# Patient Record
Sex: MALE | Race: WHITE
[De-identification: names, ages, dates, MRNs, and addresses within clinical notes are randomized per-mention and may not be internally consistent; named-entity substitution may affect disease eponyms.]

---

## 2023-01-03 ENCOUNTER — HOSPITAL ENCOUNTER (OUTPATIENT)
Dept: HOSPITAL 47 - LABPAT | Age: 53
Discharge: HOME | End: 2023-01-03
Attending: UROLOGY
Payer: COMMERCIAL

## 2023-01-03 DIAGNOSIS — N20.0: ICD-10-CM

## 2023-01-03 DIAGNOSIS — Z01.812: Primary | ICD-10-CM

## 2023-01-03 LAB
ANION GAP SERPL CALC-SCNC: 10 MMOL/L (ref 10–18)
BASOPHILS # BLD AUTO: 0.03 X 10*3/UL (ref 0–0.1)
BASOPHILS NFR BLD AUTO: 0.4 %
BUN SERPL-SCNC: 14.2 MG/DL (ref 9–27)
BUN/CREAT SERPL: 15.78 RATIO (ref 12–20)
CALCIUM SPEC-MCNC: 9.1 MG/DL (ref 8.7–10.3)
CHLORIDE SERPL-SCNC: 105 MMOL/L (ref 96–109)
CO2 SERPL-SCNC: 25 MMOL/L (ref 20–27.5)
EOSINOPHIL # BLD AUTO: 0.22 X 10*3/UL (ref 0.04–0.35)
EOSINOPHIL NFR BLD AUTO: 2.9 %
ERYTHROCYTE [DISTWIDTH] IN BLOOD BY AUTOMATED COUNT: 5.24 X 10*6/UL (ref 4.4–5.6)
ERYTHROCYTE [DISTWIDTH] IN BLOOD: 12.6 % (ref 11.5–14.5)
GLUCOSE SERPL-MCNC: 118 MG/DL (ref 70–110)
HCT VFR BLD AUTO: 43 % (ref 39.6–50)
HGB BLD-MCNC: 15.1 G/DL (ref 13–17)
IMM GRANULOCYTES BLD QL AUTO: 1.1 %
LYMPHOCYTES # SPEC AUTO: 2.14 X 10*3/UL (ref 0.9–5)
LYMPHOCYTES NFR SPEC AUTO: 28.2 %
MCH RBC QN AUTO: 28.8 PG (ref 27–32)
MCHC RBC AUTO-ENTMCNC: 35.1 G/DL (ref 32–37)
MCV RBC AUTO: 82.1 FL (ref 80–97)
MONOCYTES # BLD AUTO: 0.63 X 10*3/UL (ref 0.2–1)
MONOCYTES NFR BLD AUTO: 8.3 %
NEUTROPHILS # BLD AUTO: 4.5 X 10*3/UL (ref 1.8–7.7)
NEUTROPHILS NFR BLD AUTO: 59.1 %
NRBC BLD AUTO-RTO: 0 /100 WBCS (ref 0–0)
PH UR: 5.5 [PH] (ref 5–8)
PLATELET # BLD AUTO: 282 X 10*3/UL (ref 140–440)
POTASSIUM SERPL-SCNC: 3.9 MMOL/L (ref 3.5–5.5)
SODIUM SERPL-SCNC: 140 MMOL/L (ref 135–145)
SP GR UR: 1.02 (ref 1–1.03)
UROBILINOGEN UR QL: 0.2
WBC # BLD AUTO: 7.6 X 10*3/UL (ref 4.5–10)

## 2023-01-03 PROCEDURE — 80048 BASIC METABOLIC PNL TOTAL CA: CPT

## 2023-01-03 PROCEDURE — 81003 URINALYSIS AUTO W/O SCOPE: CPT

## 2023-01-03 PROCEDURE — 87086 URINE CULTURE/COLONY COUNT: CPT

## 2023-01-03 PROCEDURE — 85025 COMPLETE CBC W/AUTO DIFF WBC: CPT

## 2023-01-10 ENCOUNTER — HOSPITAL ENCOUNTER (OUTPATIENT)
Dept: HOSPITAL 47 - OR | Age: 53
Discharge: HOME | End: 2023-01-10
Attending: UROLOGY
Payer: COMMERCIAL

## 2023-01-10 VITALS — SYSTOLIC BLOOD PRESSURE: 142 MMHG | DIASTOLIC BLOOD PRESSURE: 86 MMHG | HEART RATE: 62 BPM | RESPIRATION RATE: 14 BRPM

## 2023-01-10 VITALS — BODY MASS INDEX: 38.3 KG/M2

## 2023-01-10 VITALS — TEMPERATURE: 97.6 F

## 2023-01-10 DIAGNOSIS — M06.9: ICD-10-CM

## 2023-01-10 DIAGNOSIS — I10: ICD-10-CM

## 2023-01-10 DIAGNOSIS — Z79.1: ICD-10-CM

## 2023-01-10 DIAGNOSIS — N20.0: Primary | ICD-10-CM

## 2023-01-10 DIAGNOSIS — Z98.890: ICD-10-CM

## 2023-01-10 DIAGNOSIS — K21.9: ICD-10-CM

## 2023-01-10 DIAGNOSIS — Z87.891: ICD-10-CM

## 2023-01-10 DIAGNOSIS — Z79.899: ICD-10-CM

## 2023-01-10 PROCEDURE — 82365 CALCULUS SPECTROSCOPY: CPT

## 2023-01-10 PROCEDURE — 74018 RADEX ABDOMEN 1 VIEW: CPT

## 2023-01-10 PROCEDURE — 52356 CYSTO/URETERO W/LITHOTRIPSY: CPT

## 2023-01-10 NOTE — FL
EXAMINATION TYPE: FL guidance operating room

 

DATE OF EXAM: 1/10/2023

 

HISTORY: Fluoroscopy  time

 

50 seconds of fluoroscopy provided. 

 

IMPRESSION:

1. Fluoroscopy time.

## 2023-01-10 NOTE — P.OP
Date of Procedure: 01/10/23


Preoperative Diagnosis: 


Bilateral renal stones


Postoperative Diagnosis: 


Same


Procedure(s) Performed: 


Cystoscopy, bilateral ureteroscopy, holmium laser lithotripsy, stone basketing 

and stent insertion


Implants: 


6-Trinidadian by 26 cm stent bilaterally left on a string


Anesthesia: PETAR


Surgeon: Mike Castrejon


Estimated Blood Loss (ml): 10


Pathology: other (bilateral renal stone)


Condition: stable


Disposition: PACU


Indications for Procedure: 


This is a 52-year-old male with history of bilateral renal stones, he is 

symptomatically from his stone.  On the right side he had greater than 7 stones.

 The largest measuring 8mm.  On the left he had multiple stones, all smaller 

than 5 mm.  Option of observation, versus ureteroscopy with holmium laser versus

ESWL was discussed with him in detail.  Risk and benefit of each approach were 

discussed.  He agreed to proceed with a bilateral ureteroscopy with holmium 

laser.  Discussed with him risk which includes but not limited to bleeding, 

infection, injury to the ureter


Operative Findings: 


Multiple Bilateral renal stones,


Description of Procedure: 


Patient brought to the operating room, general anesthesia was induced.  He was 

prepped and draped in sterile fashion and placed in a dorsal lithotomy position.

 Cystoscopy fitted with a 21-Trinidadian sheath was inserted per urethra, cystoscopy 

was performed which showed no abnormality within the bladder.  Attention was 

then carried to the right ureteral orifice which was intubated with a sensor 

wire.  Next the scope was removed with the wire in place next a 1113 Trin[place] 

access sheath was placed over the wire under fluoroscopy into the proximal 

ureter.  Next a flexible ureteroscope was inserted through the access sheath, 

renoscopy was performed showed multiple stones throughout the kidney the largest

of which was in the lower pole.  Using the holmium laser the stones were dusted.

 Sizable fragments were removed using the stone basket.  Repeat renoscopy showed

no additional stones within the kidney or injury to the kidney.  Pullback 

ureteroscopy was performed which showed no injury to the ureter or any ureteral 

fragments.  As the ureteroscope was withdrawn  a sensor wire was advanced 

through.  Next a ureteral stent was passed over the wire, the proximal curl was 

visualized on fluoroscopy and the distal curl was visualized using the scope.  

The stent was left on a string.  At this time  attention was then carried to the

left ureteral orifice which was intubated with a sensor wire.  Next under 

fluoroscopy 1113 Trin[place] access sheath was passed over the wire, into the 

proximal ureter.  A flexible ureteroscope was inserted through the access 

sheath, renoscopy was performed which showed 3 stones within the kidney.  Using 

the holmium laser the stones were dusted, sizable fragments were removed using 

the stone basket.  Repeat renoscopy showed no sizable stones within the kidney 

or injury to the kidney.  Pullback ureteroscopy was performed which showed no 

injury to the ureter or any ureteral stones.  As the ureteroscope was withdrawn 

aa sensor wire was advanced through.  Next a ureteral stent was passed over the 

wire, the proximal curl was visualized on fluoroscopy and the distal curl was 

visualized using the cystoscope.  The bladder was emptied and the end of the 

case.  Both stents were left on a string and taped to the patient penis.  

Patient tolerated the procedure well was taken to recovery in stable condition

## 2023-01-10 NOTE — XR
EXAMINATION TYPE: XR KUB

 

DATE OF EXAM: 1/10/2023

 

COMPARISON: NONE

 

HISTORY: Preop surgery

 

TECHNIQUE: 2 views supine

 

FINDINGS: There are multiple calculi over both kidneys and measure up to 8 mm. Most of the calculi ar
e less than 5 mm. Bowel gas pattern is not acute.

 

IMPRESSION: Numerous bilateral renal calculi. The largest calculus is in the lower pole right kidney.